# Patient Record
(demographics unavailable — no encounter records)

---

## 2017-01-04 NOTE — PD
HPI


Chief Complaint:  Medical Device Problem


Time Seen by Provider:  22:27


Travel History


International Travel<30 days:  No


Contact w/Intl Traveler<30days:  No


Traveled to known affect area:  No





History of Present Illness


HPI


75-year-old female came to the emergency room since her Mediport Palm which is 

delivering her chemotherapy drug started to beep while she was in the mall.  

She otherwise feels okay.  In triage she was noticed to have elevated blood 

pressure.  Vision does have history of hypertension.  No history of headache, 

chest pain or blurred vision.  She is otherwise feeling her usual self.





[facility] [place]


Past Medical History


*** Narrative Medical


Rest of her past medical history as reviewed from the nursing note.


Anxiety:  Yes


Cancer:  Yes (colon)


Cardiovascular Problems:  Yes


High Cholesterol:  Yes


Diabetes:  Yes


Diminished Hearing:  No


Endocrine:  Yes


Genitourinary:  No


Hypertension:  Yes


Immune Disorder:  No


Musculoskeletal:  No


Neurologic:  No


Reproductive:  No


Respiratory:  No


Immunizations Current:  No


:  4


Para:  3


Miscarriage:  1


Ovarian Cysts:  Yes (HAD CYST REMOVED FROM RIGHT OVARY SEVERAL YEARS AGO)





Past Surgical History


Abdominal Surgery:  Yes (gallbladder, colon resection)


AICD:  No


Appendectomy:  Yes


 Section:  Yes


Gynecologic Surgery:  Yes (c section)


Hysterectomy:  Yes


Joint Replacement:  No


Pacemaker:  No


Tonsillectomy:  Yes





Social History


Alcohol Use:  No


Tobacco Use:  No


Substance Use:  No





Allergies-Medications


(Allergen,Severity, Reaction):  


Coded Allergies:  


     Penicillin (Verified  Allergy, Severe, RASH, 17)


Comments


List of allergies reviewed from the nursing note.


Reported Meds & Prescriptions





Reported Meds & Active Scripts


Active


Reported


Lovastatin 10 Mg Tab 10 Mg PO DAILY


Losartan-Hydrochlorothiazide 100-12.5 Mg Tab 1 Tab PO DAILY


Metoprolol Succinate ER 24 HR (Metoprolol Succinate) 100 Mg Tab 100 Mg PO DAILY


Glucophage (Metformin HCl) 500 Mg Tab 500 Mg PO DAILY


     With a meal


Catapres (Clonidine) 0.1 Mg Tab 0.1 Mg PO BID


Xanax (Alprazolam) 0.25 Mg Tab 0.25 Mg PO DAILY PRN





Narrative Medication


List of her home medications reviewed from the nursing note.





Review of Systems


Except as stated in HPI:  all other systems reviewed are Neg





Physical Exam


Narrative


GENERAL: Awake, alert, no obvious distress


SKIN: Warm and dry.  MediPort on the right side of her chest.  There is a IV 

tubing that's attached to a Palm that is attached to a pouch of medication.  

The pump was beeping constantly.


HEAD: Atraumatic. Normocephalic. 


EYES: Pupils equal and round. No scleral icterus. No injection or drainage. 


ENT: No nasal bleeding or discharge.  Mucous membranes pink and moist.


NECK: Trachea midline. No JVD. 


CARDIOVASCULAR: Regular rate and rhythm.  No murmur appreciated.


RESPIRATORY: No accessory muscle use. Clear to auscultation. Breath sounds 

equal bilaterally. 


GASTROINTESTINAL: Abdomen soft, non-tender, nondistended. Hepatic and splenic 

margins not palpable. 


MUSCULOSKELETAL: No obvious deformities. No clubbing.  No cyanosis.  No edema. 


NEUROLOGICAL: Awake and alert. No obvious cranial nerve deficits.  Motor 

grossly within normal limits. Normal speech.


PSYCHIATRIC: Appropriate mood and affect; insight and judgment normal.





Data


Data


Last Documented VS





Vital Signs








  Date Time  Temp Pulse Resp B/P Pulse Ox O2 Delivery O2 Flow Rate FiO2


 


17 22:07  72 18 198/95 100 Room Air  


 


17 20:21 98.1       








Orders





 Clonidine (Catapres) (17 22:30)








[facility]


Medical Decision Making


Medical Screen Exam Complete:  Yes


Emergency Medical Condition:  Yes


Medical Record Reviewed:  Yes


Differential Diagnosis


Pump malfunction


Narrative Course


11:03 PM I turned the pump off and started it back on again.  The nurse flushed 

the MediPort and now the medication seems to be delivering okay and the pump is 

not beeping anymore.  Patient was given 0.1 mg of clonidine for her 

hypertension.  I'll discharge her home.





Procedures


EKG Prior to Arrival:  No





Diagnosis





 Primary Impression:  


 Mediport pump malfunction


 Additional Impression:  


 Essential hypertension


Referrals:  


Primary Care Physician


1 day





***Additional Instructions:


Please follow-up with your oncologist by at least doing a telephone call 

tomorrow to let them know about this issue.  Take your medications as 

prescribed.


***Med/Other Pt SpecificInfo:  No Change to Meds


Disposition:  01 DISCHARGE HOME


Condition:  Stable








Rhona Swanson MD 2017 22:06

## 2017-11-02 NOTE — PD
HPI


Chief Complaint:  Hypertension


Time Seen by Provider:  07:03


Travel History


International Travel<30 days:  No


Contact w/Intl Traveler<30days:  No


Traveled to known affect area:  No





History of Present Illness


HPI


The patient is a 76-year-old  female who presents to the emergency 

department for elevated blood pressure.  The patient was recently seen by her 

primary physician, Dr. Crenshaw, who changed some of her blood pressure 

medications.  The patient was taken off of clonidine twice a day and placed on 

Norvasc with her losartan, hydrochlorothiazide, and metoprolol.  The patient 

was doing well until last several days when she noted her blood pressure was 

elevated.  She occasionally has some facial flushing and redness to the ears.  

She denies any lightheadedness, dizziness, headache, chest pain, shortness 

breath, nausea, vomiting, or focal deficits.  The patient states the first time 

she took her Norvasc 10 mg she felt "off", and started cutting her Norvasc 

tablets in half, taking them twice a day.  The patient states she had blood 

work 2 weeks ago which was unremarkable except for possible thyroid disorder 

which is undergoing investigation.  The patient denies any symptoms this 

morning.  The patient's symptoms are mild to moderate, possibly exacerbated by 

underlying high blood pressure, and there are no currently bleeding factors.





PFSH


Past Medical History


Hx Anticoagulant Therapy:  No


Anxiety:  Yes


Cancer:  Yes (colon)


Cardiovascular Problems:  Yes (htn on meds)


High Cholesterol:  Yes


Diabetes:  Yes


Patient Takes Glucophage:  Yes (metformin)


Diminished Hearing:  No


Endocrine:  Yes


Gastrointestinal Disorders:  Yes (gallbladder removed)


Genitourinary:  No


Hypertension:  Yes


Immune Disorder:  No


Musculoskeletal:  No


Neurologic:  No


Reproductive:  No


Respiratory:  No


Immunizations Current:  Yes


Pregnant?:  Not Pregnant


:  4


Para:  3


Miscarriage:  1


Ovarian Cysts:  Yes (HAD CYST REMOVED FROM RIGHT OVARY SEVERAL YEARS AGO)





Past Surgical History


Abdominal Surgery:  Yes (gallbladder, colon resection)


AICD:  No


Appendectomy:  Yes


 Section:  Yes


Gynecologic Surgery:  Yes (c section)


Hysterectomy:  Yes


Joint Replacement:  No


Pacemaker:  No


Tonsillectomy:  Yes


Other Surgery:  Yes (right chest port chemo )





Social History


Alcohol Use:  No


Tobacco Use:  No


Substance Use:  No





Allergies-Medications


(Allergen,Severity, Reaction):  


Coded Allergies:  


     penicillin G (Unverified  Allergy, Severe, RASH, 11/2/17)


Reported Meds & Prescriptions





Reported Meds & Active Scripts


Active


Reported


Levothyroxine (Levothyroxine Sodium) 25 Mcg Tab 25 Mcg PO DAILY


Amlodipine (Amlodipine Besylate) 10 Mg Tab 10 Mg PO DAILY


Losartan-Hydrochlorothiazide 100-12.5 Mg Tab 1 Tab PO DAILY


Metoprolol Succinate ER 24 HR (Metoprolol Succinate) 100 Mg Tab 100 Mg PO DAILY


Glucophage (Metformin HCl) 500 Mg Tab 500 Mg PO DAILY


     With a meal


Xanax (Alprazolam) 0.25 Mg Tab 0.25 Mg PO DAILY PRN








Review of Systems


Except as stated in HPI:  all other systems reviewed are Neg


General / Constitutional:  No: Fever


Eyes:  No: Blurred Vision, Visual changes


HENT:  No: Headaches, Lightheadedness


Cardiovascular:  No: Chest Pain or Discomfort


Respiratory:  No: Shortness of Breath


Gastrointestinal:  No: Nausea, Vomiting, Abdominal Pain


Musculoskeletal:  Positive: Weakness


Neurologic:  No: Dizziness





Physical Exam


Narrative


GENERAL: Awake, alert, nontoxic-appearing 76 year-old female who appears her 

stated age and is in no acute respiratory distress.


SKIN: Focused skin assessment warm/dry.


HEAD: Atraumatic. Normocephalic. 


EYES: Pupils equal and round. No scleral icterus. No injection or drainage. 


ENT: No nasal bleeding or discharge.  Mucous membranes pink and moist.


NECK: Trachea midline. No JVD. 


CARDIOVASCULAR: Regular rate and rhythm.  Holosystolic murmur.


RESPIRATORY: No accessory muscle use. Clear to auscultation. Breath sounds 

equal bilaterally. 


GASTROINTESTINAL: Abdomen soft, non-tender, nondistended.  No rebound 

tenderness.


MUSCULOSKELETAL: No obvious deformities. No clubbing.  No cyanosis.  No edema. 


NEUROLOGICAL: Awake and alert. No obvious cranial nerve deficits.  Motor 

grossly within normal limits. Normal speech.  Nonfocal.  Oriented 4.  Follows 

commands without difficulty.


PSYCHIATRIC: Appropriate mood and affect; insight and judgment normal.





Data


Data


Last Documented VS





Vital Signs








  Date Time  Temp Pulse Resp B/P (MAP) Pulse Ox O2 Delivery O2 Flow Rate FiO2


 


17 07:40  68 16 159/68 (98) 97 Room Air  


 


17 06:59 98.5       











MDM


Medical Decision Making


Medical Screen Exam Complete:  Yes


Emergency Medical Condition:  Yes


Medical Record Reviewed:  Yes


Differential Diagnosis


Differential diagnosis includes hypertension, hypertensive urgency, 

hypertensive emergency, acute renal failure, hyponatremia, anxiety, 

somatization.


Narrative Course


The patient states she normal blood work shows 2 weeks ago.  Therefore, no 

initial IV or laboratory evaluation was obtained.  The patient's initial blood 

pressure was 150s/70s,.  Upon reevaluation was 180/80s, however, patient had 

her arm bent at 90.  Therefore, the blood pressure was reevaluated.  She is 

currently asymptomatic.  The patient's blood pressure was reevaluated, was 159/

68.  The patient is asymptomatic.  She is advised to keep a blood pressure log 

and follow-up with her primary physician.  Return if symptoms worsen or 

progress.





Diagnosis





 Primary Impression:  


 Essential hypertension


Patient Instructions:  General Instructions





***Additional Instructions:  


Keep a blood pressure log.  Follow-up with her primary physician.  Return if 

symptoms worsen or progress.


***Med/Other Pt SpecificInfo:  No Change to Meds


Disposition:  01 DISCHARGE HOME


Condition:  Stable











Ameya Sanchez MD 2017 07:21